# Patient Record
Sex: MALE | Race: WHITE | Employment: FULL TIME | URBAN - NONMETROPOLITAN AREA
[De-identification: names, ages, dates, MRNs, and addresses within clinical notes are randomized per-mention and may not be internally consistent; named-entity substitution may affect disease eponyms.]

---

## 2017-01-18 ENCOUNTER — HOSPITAL ENCOUNTER (EMERGENCY)
Facility: HOSPITAL | Age: 37
Discharge: HOME | End: 2017-01-18
Attending: PHYSICIAN ASSISTANT | Admitting: PHYSICIAN ASSISTANT
Payer: COMMERCIAL

## 2017-01-18 VITALS
DIASTOLIC BLOOD PRESSURE: 101 MMHG | HEART RATE: 99 BPM | TEMPERATURE: 96.9 F | SYSTOLIC BLOOD PRESSURE: 148 MMHG | OXYGEN SATURATION: 96 % | RESPIRATION RATE: 18 BRPM

## 2017-01-18 DIAGNOSIS — S93.401A SPRAIN OF RIGHT ANKLE, UNSPECIFIED LIGAMENT, INITIAL ENCOUNTER: ICD-10-CM

## 2017-01-18 PROCEDURE — 99213 OFFICE O/P EST LOW 20 MIN: CPT | Performed by: PHYSICIAN ASSISTANT

## 2017-01-18 PROCEDURE — 73610 X-RAY EXAM OF ANKLE: CPT | Mod: TC,RT

## 2017-01-18 PROCEDURE — 99213 OFFICE O/P EST LOW 20 MIN: CPT

## 2017-01-18 ASSESSMENT — ENCOUNTER SYMPTOMS
PSYCHIATRIC NEGATIVE: 1
MYALGIAS: 1
CARDIOVASCULAR NEGATIVE: 1
ARTHRALGIAS: 1
NEUROLOGICAL NEGATIVE: 1
CONSTITUTIONAL NEGATIVE: 1

## 2017-01-18 NOTE — ED AVS SNAPSHOT
[facility]    [address] [place] [address]    [place] [address]    Phone:  [phone]                                       Lionel Chavez   MRN: [id number]    Department:  [facility]   Date of Visit:  1/18/2017           After Visit Summary Signature Page     I have received my discharge instructions, and my questions have been answered. I have discussed any challenges I see with this plan with the nurse or doctor.    ..........................................................................................................................................  Patient/Patient Representative Signature      ..........................................................................................................................................  Patient Representative Print Name and Relationship to Patient    ..................................................               ................................................  Date                                            Time    ..........................................................................................................................................  Reviewed by Signature/Title    ...................................................              ..............................................  Date                                                            Time

## 2017-01-18 NOTE — PROGRESS NOTES
Quick Note:    Ankle XR. Impression: Tiny calcific densities involving the anterior aspect of the tibiotalar joint. Routed to PCP Dr. Oliva and pt instructed to follow up.  ______

## 2017-01-18 NOTE — ED NOTES
Patient came in with right ankle pain that he hurt on January 12,2017. 7/10 pain. Feels tingling up right leg. Twisted his leg while walking.  Has not taken any pain medication.

## 2017-01-18 NOTE — ED AVS SNAPSHOT
[facility]    [address] [address]    Phone:  [phone]                                       Lionel Chavez   MRN: [id number]    Department:  [facility]   Date of Visit:  1/18/2017           Patient Information     Date Of Birth          1980        Your diagnoses for this visit were:     Sprain of right ankle, unspecified ligament, initial encounter        You were seen by Gisele Pimentel PA.      Follow-up Information     Follow up with Jc Oliva MD.    Specialty:  Family Practice    Why:  If symptoms worsen or if not resolving in next 10 days and for reevaluation of your blood pressure!!    Contact information:    [facility]  [address] [address]  [phone]          Follow up with [facility].    Specialty:  EMERGENCY MEDICINE    Why:  If further concerns develop    Contact information:    [address] [address] [phone]    Additional information:    From [facility]: Take [address]. Turn left at [address]. Turn left at the first stoplight on [address]. At the first stop sign, take a right onto [address]. Take a left into the parking lot and continue through until you reach the North enterance of the building.       From [place]: Take [address]. Take the [address] ramp towards [place]. Turn left onto [address]. Take a slight right onto [address] [facility] [address] [facility]. Turn left at the first stoplight on [address]. At the first stop sign, take a right onto [address]. Take a left into the parking lot and continue through until you reach the North enterance of the building.       From [place]: Take [address]. Take a right at [address]. At the first stop sign, take a right onto [address]. Take a left into the parking lot and continue through until you reach the North enterance of the building.      "  Discharge References/Attachments     ACE WRAP (ENGLISH)    SPRAIN, ANKLE (ADULT) (ENGLISH)    HIGH BLOOD PRESSURE, YOUR RISK FACTORS (ENGLISH)    HYPERTENSION, TO BE CONFIRMED (ENGLISH)         Review of your medicines      Notice     You have not been prescribed any medications.            Procedures and tests performed during your visit     XR Ankle Right G/E 3 Views      Orders Needing Specimen Collection     None      Pending Results     Date and Time Order Name Status Description    2017 1210 XR Ankle Right G/E 3 Views In process             Pending Culture Results     No orders found from 2017 to 2017.            Thank you for choosing [facility]       Thank you for choosing [facility] for your care. Our goal is always to provide you with excellent care. Hearing back from our patients is one way we can continue to improve our services. Please take a few minutes to complete the written survey that you may receive in the mail after you visit with us. Thank you!        [facility] Information     hint lets you send messages to your doctor, view your test results, renew your prescriptions, schedule appointments and more. To sign up, go to [email or web address] . Click on \"Log in\" on the left side of the screen, which will take you to the Welcome page. Then click on \"Sign up Now\" on the right side of the page.     You will be asked to enter the access code listed below, as well as some personal information. Please follow the directions to create your username and password.     Your access code is: [email or web address]  Expires: 2017 12:36 PM     Your access code will  in 90 days. If you need help or a new code, please call your [facility] clinic or [phone].        Care EveryWhere ID     This is your Care EveryWhere ID. This could be used by other organizations to access your [facility] medical records  [id number]        After Visit Summary       This is your record. Keep this with you and show " to your community pharmacist(s) and doctor(s) at your next visit.
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
## 2017-04-03 ENCOUNTER — HOSPITAL ENCOUNTER (EMERGENCY)
Facility: HOSPITAL | Age: 37
Discharge: LEFT BEFORE DISCHARGE | End: 2017-04-03
Attending: PHYSICIAN ASSISTANT | Admitting: PHYSICIAN ASSISTANT
Payer: COMMERCIAL

## 2017-04-03 VITALS
DIASTOLIC BLOOD PRESSURE: 115 MMHG | RESPIRATION RATE: 13 BRPM | SYSTOLIC BLOOD PRESSURE: 156 MMHG | HEART RATE: 105 BPM | OXYGEN SATURATION: 96 % | TEMPERATURE: 97.5 F

## 2017-04-03 DIAGNOSIS — T69.9XXA EXPOSURE TO ENVIRONMENTAL COLD, INITIAL ENCOUNTER: ICD-10-CM

## 2017-04-03 DIAGNOSIS — T68.XXXA HYPOTHERMIA, INITIAL ENCOUNTER: ICD-10-CM

## 2017-04-03 PROCEDURE — 99282 EMERGENCY DEPT VISIT SF MDM: CPT

## 2017-04-03 PROCEDURE — 99283 EMERGENCY DEPT VISIT LOW MDM: CPT | Performed by: PHYSICIAN ASSISTANT

## 2017-04-03 ASSESSMENT — ENCOUNTER SYMPTOMS
SHORTNESS OF BREATH: 0
DECREASED CONCENTRATION: 0
AGITATION: 0
COLOR CHANGE: 1

## 2017-04-03 NOTE — ED NOTES
Attempted to call pt at his contact number he provided To return or go to his provider to have his tdap injection updated. No connection made. Attempted X2. Pt was gone from room when RN in to give ordered medication. Pt had not been discharged.
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
## 2017-05-19 ENCOUNTER — APPOINTMENT (OUTPATIENT)
Dept: OCCUPATIONAL MEDICINE | Facility: OTHER | Age: 37
End: 2017-05-19

## 2017-05-19 PROCEDURE — 92552 PURE TONE AUDIOMETRY AIR: CPT

## 2017-08-02 DIAGNOSIS — S99.919A ANKLE INJURY: ICD-10-CM

## 2017-08-02 DIAGNOSIS — M25.571 PAIN IN JOINT, ANKLE AND FOOT, RIGHT: Primary | ICD-10-CM

## 2017-08-04 ENCOUNTER — HOSPITAL ENCOUNTER (OUTPATIENT)
Dept: MRI IMAGING | Facility: HOSPITAL | Age: 37
Discharge: HOME | End: 2017-08-04
Attending: FAMILY MEDICINE | Admitting: FAMILY MEDICINE
Payer: COMMERCIAL

## 2017-08-04 PROCEDURE — 73721 MRI JNT OF LWR EXTRE W/O DYE: CPT | Mod: TC,RT

## 2020-04-11 ENCOUNTER — HOSPITAL ENCOUNTER (EMERGENCY)
Facility: HOSPITAL | Age: 40
Discharge: HOME | End: 2020-04-11
Attending: NURSE PRACTITIONER | Admitting: NURSE PRACTITIONER
Payer: COMMERCIAL

## 2020-04-11 VITALS
SYSTOLIC BLOOD PRESSURE: 145 MMHG | RESPIRATION RATE: 18 BRPM | OXYGEN SATURATION: 98 % | TEMPERATURE: 97.9 F | DIASTOLIC BLOOD PRESSURE: 73 MMHG

## 2020-04-11 DIAGNOSIS — H66.002 ACUTE SUPPURATIVE OTITIS MEDIA OF LEFT EAR WITHOUT SPONTANEOUS RUPTURE OF TYMPANIC MEMBRANE: Primary | ICD-10-CM

## 2020-04-11 DIAGNOSIS — H10.33 ACUTE CONJUNCTIVITIS OF BOTH EYES: ICD-10-CM

## 2020-04-11 PROCEDURE — G0463 HOSPITAL OUTPT CLINIC VISIT: HCPCS

## 2020-04-11 PROCEDURE — 25000132 ZZH RX MED GY IP 250 OP 250 PS 637: Performed by: NURSE PRACTITIONER

## 2020-04-11 PROCEDURE — 99213 OFFICE O/P EST LOW 20 MIN: CPT | Mod: Z6 | Performed by: NURSE PRACTITIONER

## 2020-04-11 RX ADMIN — AMOXICILLIN AND CLAVULANATE POTASSIUM 1 TABLET: 875; 125 TABLET, FILM COATED ORAL at 19:04

## 2020-04-11 ASSESSMENT — ENCOUNTER SYMPTOMS
NAUSEA: 0
EYE REDNESS: 1
EYE PAIN: 0
COUGH: 0
EYE DISCHARGE: 1
FEVER: 0
CHILLS: 0
PHOTOPHOBIA: 0
VOMITING: 0
SHORTNESS OF BREATH: 0

## 2020-04-11 NOTE — ED TRIAGE NOTES
Pt presents with left ear pain since today, right and left eye redness and mattering since yesterday, slight sore throat since Thursday.

## 2020-04-11 NOTE — ED AVS SNAPSHOT
[facility]  [address] [place] [address]  [place] [address]  Phone:  [phone]                                    Lionel Chavez   MRN: [id number]    Department:  [facility]   Date of Visit:  4/11/2020           After Visit Summary Signature Page    I have received my discharge instructions, and my questions have been answered. I have discussed any challenges I see with this plan with the nurse or doctor.    ..........................................................................................................................................  Patient/Patient Representative Signature      ..........................................................................................................................................  Patient Representative Print Name and Relationship to Patient    ..................................................               ................................................  Date                                   Time    ..........................................................................................................................................  Reviewed by Signature/Title    ...................................................              ..............................................  Date                                               Time          22EPIC Rev 08/18

## 2020-04-12 NOTE — DISCHARGE INSTRUCTIONS
Take antibiotic as prescribed for your left ear infection.  Place a warm compress to the affected ear.  You can take Tylenol or ibuprofen as needed for the pain.    Make sure you are drinking fluids to stay hydrated can try an OTC oral decongestant to help with the nasal congestion.  Make sure you are washing your hands and avoid touching your eyes.    Follow-up with your doctor in 2 weeks if no improvement in symptoms.    Return to emergency department for worsening or concerning symptoms.

## 2021-04-16 ENCOUNTER — HOSPITAL ENCOUNTER (EMERGENCY)
Facility: HOSPITAL | Age: 41
Discharge: HOME | End: 2021-04-16
Attending: EMERGENCY MEDICINE | Admitting: EMERGENCY MEDICINE
Payer: COMMERCIAL

## 2021-04-16 VITALS
DIASTOLIC BLOOD PRESSURE: 113 MMHG | TEMPERATURE: 97.3 F | SYSTOLIC BLOOD PRESSURE: 167 MMHG | WEIGHT: 315 LBS | RESPIRATION RATE: 13 BRPM | OXYGEN SATURATION: 97 % | HEART RATE: 103 BPM

## 2021-04-16 DIAGNOSIS — E11.9 TYPE 2 DIABETES MELLITUS WITHOUT COMPLICATION, WITHOUT LONG-TERM CURRENT USE OF INSULIN (H): ICD-10-CM

## 2021-04-16 LAB
ALBUMIN SERPL-MCNC: 3.8 G/DL (ref 3.4–5)
ALBUMIN UR-MCNC: NEGATIVE MG/DL
ALP SERPL-CCNC: 157 U/L (ref 40–150)
ALT SERPL W P-5'-P-CCNC: 115 U/L (ref 0–70)
ANION GAP SERPL CALCULATED.3IONS-SCNC: 6 MMOL/L (ref 3–14)
APPEARANCE UR: CLEAR
AST SERPL W P-5'-P-CCNC: 43 U/L (ref 0–45)
BACTERIA #/AREA URNS HPF: ABNORMAL /HPF
BASOPHILS # BLD AUTO: 0 10E9/L (ref 0–0.2)
BASOPHILS NFR BLD AUTO: 0.6 %
BILIRUB SERPL-MCNC: 0.6 MG/DL (ref 0.2–1.3)
BILIRUB UR QL STRIP: NEGATIVE
BUN SERPL-MCNC: 16 MG/DL (ref 7–30)
CALCIUM SERPL-MCNC: 8.7 MG/DL (ref 8.5–10.1)
CHLORIDE SERPL-SCNC: 101 MMOL/L (ref 94–109)
CO2 SERPL-SCNC: 24 MMOL/L (ref 20–32)
COLOR UR AUTO: ABNORMAL
CREAT SERPL-MCNC: 0.88 MG/DL (ref 0.66–1.25)
DIFFERENTIAL METHOD BLD: NORMAL
EOSINOPHIL # BLD AUTO: 0.3 10E9/L (ref 0–0.7)
EOSINOPHIL NFR BLD AUTO: 5.3 %
ERYTHROCYTE [DISTWIDTH] IN BLOOD BY AUTOMATED COUNT: 12.7 % (ref 10–15)
EST. AVERAGE GLUCOSE BLD GHB EST-MCNC: 229 MG/DL
GFR SERPL CREATININE-BSD FRML MDRD: >90 ML/MIN/{1.73_M2}
GLUCOSE BLDC GLUCOMTR-MCNC: 335 MG/DL (ref 70–99)
GLUCOSE BLDC GLUCOMTR-MCNC: 450 MG/DL (ref 70–99)
GLUCOSE SERPL-MCNC: 411 MG/DL (ref 70–99)
GLUCOSE UR STRIP-MCNC: >1000 MG/DL
HBA1C MFR BLD: 9.6 % (ref 0–5.6)
HCT VFR BLD AUTO: 49.2 % (ref 40–53)
HGB BLD-MCNC: 16.9 G/DL (ref 13.3–17.7)
HGB UR QL STRIP: NEGATIVE
IMM GRANULOCYTES # BLD: 0 10E9/L (ref 0–0.4)
IMM GRANULOCYTES NFR BLD: 0.3 %
KETONES UR STRIP-MCNC: 10 MG/DL
LEUKOCYTE ESTERASE UR QL STRIP: NEGATIVE
LYMPHOCYTES # BLD AUTO: 1.3 10E9/L (ref 0.8–5.3)
LYMPHOCYTES NFR BLD AUTO: 21.4 %
MCH RBC QN AUTO: 31.2 PG (ref 26.5–33)
MCHC RBC AUTO-ENTMCNC: 34.3 G/DL (ref 31.5–36.5)
MCV RBC AUTO: 91 FL (ref 78–100)
MONOCYTES # BLD AUTO: 0.4 10E9/L (ref 0–1.3)
MONOCYTES NFR BLD AUTO: 7.1 %
NEUTROPHILS # BLD AUTO: 4 10E9/L (ref 1.6–8.3)
NEUTROPHILS NFR BLD AUTO: 65.3 %
NITRATE UR QL: NEGATIVE
NRBC # BLD AUTO: 0 10*3/UL
NRBC BLD AUTO-RTO: 0 /100
PH UR STRIP: 5.5 PH (ref 4.7–8)
PLATELET # BLD AUTO: 181 10E9/L (ref 150–450)
POTASSIUM SERPL-SCNC: 4.5 MMOL/L (ref 3.4–5.3)
PROT SERPL-MCNC: 7.8 G/DL (ref 6.8–8.8)
RBC # BLD AUTO: 5.41 10E12/L (ref 4.4–5.9)
RBC #/AREA URNS AUTO: 1 /HPF (ref 0–2)
SODIUM SERPL-SCNC: 131 MMOL/L (ref 133–144)
SOURCE: ABNORMAL
SP GR UR STRIP: 1.01 (ref 1–1.03)
SQUAMOUS #/AREA URNS AUTO: 0 /HPF (ref 0–1)
UROBILINOGEN UR STRIP-MCNC: NORMAL MG/DL (ref 0–2)
WBC # BLD AUTO: 6.2 10E9/L (ref 4–11)
WBC #/AREA URNS AUTO: 1 /HPF (ref 0–5)

## 2021-04-16 PROCEDURE — 999N001182 HC STATISTIC ESTIMATED AVERAGE GLUCOSE: Performed by: EMERGENCY MEDICINE

## 2021-04-16 PROCEDURE — 81001 URINALYSIS AUTO W/SCOPE: CPT | Performed by: EMERGENCY MEDICINE

## 2021-04-16 PROCEDURE — 80053 COMPREHEN METABOLIC PANEL: CPT | Performed by: EMERGENCY MEDICINE

## 2021-04-16 PROCEDURE — 83036 HEMOGLOBIN GLYCOSYLATED A1C: CPT | Performed by: EMERGENCY MEDICINE

## 2021-04-16 PROCEDURE — 36415 COLL VENOUS BLD VENIPUNCTURE: CPT

## 2021-04-16 PROCEDURE — 99284 EMERGENCY DEPT VISIT MOD MDM: CPT | Performed by: EMERGENCY MEDICINE

## 2021-04-16 PROCEDURE — 258N000003 HC RX IP 258 OP 636: Performed by: EMERGENCY MEDICINE

## 2021-04-16 PROCEDURE — 96361 HYDRATE IV INFUSION ADD-ON: CPT

## 2021-04-16 PROCEDURE — 999N001017 HC STATISTIC GLUCOSE BY METER IP

## 2021-04-16 PROCEDURE — 96374 THER/PROPH/DIAG INJ IV PUSH: CPT

## 2021-04-16 PROCEDURE — 85025 COMPLETE CBC W/AUTO DIFF WBC: CPT | Performed by: EMERGENCY MEDICINE

## 2021-04-16 PROCEDURE — 250N000012 HC RX MED GY IP 250 OP 636 PS 637: Performed by: EMERGENCY MEDICINE

## 2021-04-16 PROCEDURE — 99284 EMERGENCY DEPT VISIT MOD MDM: CPT | Mod: 25

## 2021-04-16 RX ORDER — DEXTROSE MONOHYDRATE 25 G/50ML
25-50 INJECTION, SOLUTION INTRAVENOUS
Status: DISCONTINUED | OUTPATIENT
Start: 2021-04-16 | End: 2021-04-16 | Stop reason: HOSPADM

## 2021-04-16 RX ORDER — SODIUM CHLORIDE 9 MG/ML
INJECTION, SOLUTION INTRAVENOUS CONTINUOUS
Status: DISCONTINUED | OUTPATIENT
Start: 2021-04-16 | End: 2021-04-16 | Stop reason: HOSPADM

## 2021-04-16 RX ORDER — NICOTINE POLACRILEX 4 MG
15-30 LOZENGE BUCCAL
Status: DISCONTINUED | OUTPATIENT
Start: 2021-04-16 | End: 2021-04-16 | Stop reason: HOSPADM

## 2021-04-16 RX ADMIN — INSULIN HUMAN 8 UNITS: 100 INJECTION, SOLUTION PARENTERAL at 09:54

## 2021-04-16 RX ADMIN — SODIUM CHLORIDE 1000 ML: 9 INJECTION, SOLUTION INTRAVENOUS at 09:54

## 2021-04-16 ASSESSMENT — ENCOUNTER SYMPTOMS
DYSURIA: 0
SINUS PAIN: 0
ABDOMINAL PAIN: 0
BACK PAIN: 0
SHORTNESS OF BREATH: 0
POLYDIPSIA: 1
APPETITE CHANGE: 0
POLYPHAGIA: 1
ACTIVITY CHANGE: 0
CHEST TIGHTNESS: 0

## 2021-04-16 NOTE — ED NOTES
Pt educated on metformin BID until appointment with Dr. Oliva next week and get set up with diabetic ed after that. Pt educated on high and low blood sugar symptoms and will return here this weekend with any other concerns for worsening symptoms.

## 2021-04-16 NOTE — DISCHARGE INSTRUCTIONS
Lionel, you have a follow up appointment with Dr Jc Oliva at the [facility] on Wednesday April 21st at 3pm.  They will arrange Diabetic Education for you at that appointment.

## 2021-04-16 NOTE — ED TRIAGE NOTES
"Presents with reports of high blood sugar. Patient denies hx of DM - states he has been experiencing polydipsia and polyuria for \"a while now.\" Took his blood sugar on his Mom's glucometer and the reading was 506. BG in triage 450.  "

## 2021-04-16 NOTE — ED NOTES
Care Transitions focused note:      Follow up appointment made with Dr Jc Oliva at [facility] on Wednesday April 21st at 3pm.  They will set up with Diabetic education.    JOSELIN Ricardo

## 2021-04-16 NOTE — ED PROVIDER NOTES
"  History     Chief Complaint   Patient presents with     Hyperglycemia     HPI  Lionel Chavez is a 40 year old male who comes to the emergency department complaining of elevated blood sugar.  Mr. Campbell states he has had polyuria and polydipsia over the past few days.  His mother is diabetic.  He did an Accu-Chek at home and his blood sugar was elevated to \"500\".  He states he is otherwise been feeling well.  He denies feeling dizzy or lightheaded.  Is not having abdominal pain.  He has had no nausea or vomiting.  He said no change in his bowel or bladder habits.  He has no diagnosis of being diabetic.  He states he always has been a large person.  He states that he is actually gained a fair amount of weight recently as he had an ankle injury and has not been able to exercise.    Allergies:  No Known Allergies    Problem List:    Patient Active Problem List    Diagnosis Date Noted     Type 2 diabetes mellitus without complication, without long-term current use of insulin (H) 04/16/2021     Priority: Medium        Past Medical History:    No past medical history on file.    Past Surgical History:    No past surgical history on file.    Family History:    No family history on file.    Social History:  Marital Status:  Single [1]  Social History     Tobacco Use     Smoking status: Never Smoker   Substance Use Topics     Alcohol use: Yes     Comment: occasional     Drug use: No        Medications:    metFORMIN (GLUCOPHAGE) 500 MG tablet          Review of Systems   Constitutional: Negative for activity change and appetite change.   HENT: Negative for congestion and sinus pain.    Respiratory: Negative for chest tightness and shortness of breath.    Cardiovascular: Negative for chest pain.   Gastrointestinal: Negative for abdominal pain.   Endocrine: Positive for polydipsia and polyphagia.   Genitourinary: Negative for dysuria.   Musculoskeletal: Negative for back pain.   Other appropriate systems were reviewed and " found to be unremarkable    Physical Exam   BP: (!) 157/115  Pulse: 88  Temp: 97.3  F (36.3  C)  Resp: 16  Weight: (!) 164 kg (361 lb 8.9 oz)  SpO2: 95 %      Physical Exam this is a 40-year-old gentleman who is awake alert he is very pleasant and cooperative with my exam he does not appear in acute distress HEENT normocephalic atraumatic extraocular muscles intact pupils equally round and active to light oropharynx is clear oral mucosa is moist neck is supple his range of motion pain lungs are clear bilaterally heart is regular rate and rhythm S1-S2 sounds are appreciated the abdomen is obese its nontender no mass no organomegaly no rebound no involuntary guarding extremities has full range of motion 5/5 strength no edema is noted neurologic exam no focal neurologic deficits appreciated    ED Course                             Results for orders placed or performed during the hospital encounter of 04/16/21 (from the past 24 hour(s))   Glucose by meter   Result Value Ref Range    Glucose 450 (H) 70 - 99 mg/dL   Routine UA with microscopic   Result Value Ref Range    Color Urine Light Yellow     Appearance Urine Clear     Glucose Urine >1000 (A) NEG^Negative mg/dL    Bilirubin Urine Negative NEG^Negative    Ketones Urine 10 (A) NEG^Negative mg/dL    Specific Gravity Urine 1.015 1.003 - 1.035    Blood Urine Negative NEG^Negative    pH Urine 5.5 4.7 - 8.0 pH    Protein Albumin Urine Negative NEG^Negative mg/dL    Urobilinogen mg/dL Normal 0.0 - 2.0 mg/dL    Nitrite Urine Negative NEG^Negative    Leukocyte Esterase Urine Negative NEG^Negative    Source Midstream Urine     WBC Urine 1 0 - 5 /HPF    RBC Urine 1 0 - 2 /HPF    Bacteria Urine None (A) NEG^Negative /HPF    Squamous Epithelial /HPF Urine 0 0 - 1 /HPF   CBC with platelets differential   Result Value Ref Range    WBC 6.2 4.0 - 11.0 10e9/L    RBC Count 5.41 4.4 - 5.9 10e12/L    Hemoglobin 16.9 13.3 - 17.7 g/dL    Hematocrit 49.2 40.0 - 53.0 %    MCV 91 78 - 100  fl    MCH 31.2 26.5 - 33.0 pg    MCHC 34.3 31.5 - 36.5 g/dL    RDW 12.7 10.0 - 15.0 %    Platelet Count 181 150 - 450 10e9/L    Diff Method Automated Method     % Neutrophils 65.3 %    % Lymphocytes 21.4 %    % Monocytes 7.1 %    % Eosinophils 5.3 %    % Basophils 0.6 %    % Immature Granulocytes 0.3 %    Nucleated RBCs 0 0 /100    Absolute Neutrophil 4.0 1.6 - 8.3 10e9/L    Absolute Lymphocytes 1.3 0.8 - 5.3 10e9/L    Absolute Monocytes 0.4 0.0 - 1.3 10e9/L    Absolute Eosinophils 0.3 0.0 - 0.7 10e9/L    Absolute Basophils 0.0 0.0 - 0.2 10e9/L    Abs Immature Granulocytes 0.0 0 - 0.4 10e9/L    Absolute Nucleated RBC 0.0    Comprehensive metabolic panel   Result Value Ref Range    Sodium 131 (L) 133 - 144 mmol/L    Potassium 4.5 3.4 - 5.3 mmol/L    Chloride 101 94 - 109 mmol/L    Carbon Dioxide 24 20 - 32 mmol/L    Anion Gap 6 3 - 14 mmol/L    Glucose 411 (H) 70 - 99 mg/dL    Urea Nitrogen 16 7 - 30 mg/dL    Creatinine 0.88 0.66 - 1.25 mg/dL    GFR Estimate >90 >60 mL/min/[1.73_m2]    GFR Estimate If Black >90 >60 mL/min/[1.73_m2]    Calcium 8.7 8.5 - 10.1 mg/dL    Bilirubin Total 0.6 0.2 - 1.3 mg/dL    Albumin 3.8 3.4 - 5.0 g/dL    Protein Total 7.8 6.8 - 8.8 g/dL    Alkaline Phosphatase 157 (H) 40 - 150 U/L     (H) 0 - 70 U/L    AST 43 0 - 45 U/L   Glucose by meter   Result Value Ref Range    Glucose 335 (H) 70 - 99 mg/dL       Medications   0.9% sodium chloride BOLUS (0 mLs Intravenous Stopped 4/16/21 1101)     Followed by   sodium chloride 0.9% infusion (has no administration in time range)   glucose gel 15-30 g (has no administration in time range)     Or   dextrose 50 % injection 25-50 mL (has no administration in time range)     Or   glucagon injection 1 mg (has no administration in time range)   insulin (regular) (HumuLIN R/NovoLIN R) injection 8 Units (8 Units Intravenous Given 4/16/21 0954)       Assessments & Plan (with Medical Decision Making)     I have reviewed the nursing notes.    I have  reviewed the findings, diagnosis, plan and need for follow up with the patient.  I will have Mr. Chavez follow-up with his primary care provider this coming week.  I will order a hemoglobin A1c.  Will start on Metformin.    New Prescriptions    METFORMIN (GLUCOPHAGE) 500 MG TABLET    Take 1 tablet (500 mg) by mouth 2 times daily (with meals)       Final diagnoses:   Type 2 diabetes mellitus without complication, without long-term current use of insulin (H)       4/16/2021   [facility]

## 2021-07-11 ENCOUNTER — HOSPITAL ENCOUNTER (EMERGENCY)
Facility: HOSPITAL | Age: 41
Discharge: HOME | End: 2021-07-11
Attending: NURSE PRACTITIONER | Admitting: NURSE PRACTITIONER
Payer: COMMERCIAL

## 2021-07-11 VITALS
HEART RATE: 100 BPM | TEMPERATURE: 97.6 F | DIASTOLIC BLOOD PRESSURE: 97 MMHG | RESPIRATION RATE: 20 BRPM | SYSTOLIC BLOOD PRESSURE: 146 MMHG | OXYGEN SATURATION: 98 %

## 2021-07-11 DIAGNOSIS — J32.9 SINUSITIS, UNSPECIFIED CHRONICITY, UNSPECIFIED LOCATION: ICD-10-CM

## 2021-07-11 DIAGNOSIS — J32.9 SINUSITIS: ICD-10-CM

## 2021-07-11 PROCEDURE — 99213 OFFICE O/P EST LOW 20 MIN: CPT | Performed by: NURSE PRACTITIONER

## 2021-07-11 PROCEDURE — G0463 HOSPITAL OUTPT CLINIC VISIT: HCPCS

## 2021-07-11 RX ORDER — FLUTICASONE PROPIONATE 50 MCG
1 SPRAY, SUSPENSION (ML) NASAL DAILY
Qty: 15.8 ML | Refills: 0 | Status: SHIPPED | OUTPATIENT
Start: 2021-07-11

## 2021-07-11 ASSESSMENT — ENCOUNTER SYMPTOMS
DIZZINESS: 0
NAUSEA: 0
SINUS PRESSURE: 1
DIARRHEA: 0
HEADACHES: 1
VOMITING: 0
SINUS PAIN: 1
TROUBLE SWALLOWING: 0
FEVER: 0
SORE THROAT: 1
SHORTNESS OF BREATH: 0
APPETITE CHANGE: 0
COUGH: 1
LIGHT-HEADEDNESS: 0
RHINORRHEA: 1
ACTIVITY CHANGE: 1
CHILLS: 0
FATIGUE: 1

## 2021-07-11 NOTE — DISCHARGE INSTRUCTIONS
Increase oral intake, cool mist vaporizer as needed, rest, avoid sharing utensils, practice good hand washing techniques, cover mouth when you cough and sneeze. Throw toothbursh away 24 hours after starting antibiotics.  Over the counter medications such as ibuprofen and/or acetaminophen for fever and generalized aches and pains. Ibuprofen 400 to 800 mg (2 - 4 tabs of over the counter med) every six to eight hours as needed;not to exceed maximum amount of 3200 mg in 24 hours.Tylenol 650 to 1000 mg every four to six hours as needed (not to exceed more than 4000 mg in a 24 hour period). May use interchangeably. Robitussin (guaifenesin) for cough. Chest rubs such as Keo's or Mentholatum may help reduce sore throat symptoms.  Chloraseptic spray for sore throat or menthol lozenges may be helpful for sore throat. Be reevaluated if symptoms persist longer than 10 - 14 days or worsen and if there is no improvement in 72 hours or worsening of symptoms.  Increase fluids.     OTC decongestants (oral or topical).  Decongestants (oral or topical) cause vasoconstriction of the nasal mucosa.  We prefer oral pseudoephedrine to phenylephrine and other oral OTC nasal decongestants. Side effects of oral decongestants may include tachycardia, elevated diastolic blood pressure, and palpitations. Pseudoephedrine 30 to 60 mg every four to six hours as needed for congestion. (Maximum dose is 240 mg in 24 hours). Do not use longer than 72 hours.    Commonly used topical decongestants include oxymetazoline, xylometazoline, and phenylephrine. Side effects of topical decongestants include nosebleeds and drying of the nasal membranes. Topical decongestants should only be used for two to three days; longer use may result in rebound nasal congestion after discontinuation.     Loratadine (Claritin) or cetirizine (Zyrtec) 20 mg  daily for ten to fourteen days to see if symptoms lessen or resolve. If the medication seems to help you may take 10  mg daily on an ongoing basis.  May buy over the counter.  .    Fluids, herbs, and foods for sore throat relief -- Adjusting the temperature and texture of foods and beverages may provide local relief of sore throat pain. While data showing benefit are quite limited, these approaches are intuitive. We typically advise these measures since they are likely to be safe with minimal adverse effect, and patients often describe relief of symptoms.  We suggest hydration with frozen (eg, ice or popsicles) or heated liquids (eg, teas, soups), rather than room temperature or refrigerated fluids in patient with significant sore throat pain. Very cold foods can have a numbing-like effect that temporarily reduces or alleviates the pain of swallowing. Ice cubes or frozen popsicles facilitate hydration; ice cream and frozen yogurt provide caloric intake.  Warm fluids and foods, including teas, soups, and soft non-irritating foods, may be better tolerated by patients with throat pain than irritating foods (eg, rough-textured or spicy foods) or fluids at room temperatures. Foods that coat the throat, including honey and hard candies, can facilitate intake of calories while temporarily relieving throat pain.

## 2021-07-11 NOTE — ED PROVIDER NOTES
History     Chief Complaint   Patient presents with     Sinusitis     HPI  Lionel Chavez is a 41 year old male who presents with a 4-day history of fatigue, ear pain, runny nose, sinus pain and pressure, cough, headaches, and a little bit of a sore throat.  No over-the-counter medications have been taken.  No known sick contacts.  Has not received the Covid vaccination.  Non-smoker.  States has had sinus injury and his symptoms are always exacerbated when he gets sick. Denies fevers, chills, nausea, vomiting, diarrhea, and shortness of breath.    Allergies:  No Known Allergies    Problem List:    Patient Active Problem List    Diagnosis Date Noted     Type 2 diabetes mellitus without complication, without long-term current use of insulin (H) 04/16/2021     Priority: Medium        Past Medical History:    No past medical history on file.    Past Surgical History:    No past surgical history on file.    Family History:    No family history on file.    Social History:  Marital Status:  Single [1]  Social History     Tobacco Use     Smoking status: Never Smoker   Substance Use Topics     Alcohol use: Yes     Comment: occasional     Drug use: No        Medications:    fluticasone (FLONASE) 50 MCG/ACT nasal spray          Review of Systems   Constitutional: Positive for activity change and fatigue. Negative for appetite change, chills and fever.   HENT: Positive for ear pain, rhinorrhea, sinus pressure, sinus pain and sore throat (little). Negative for trouble swallowing.    Eyes:        Hurt   Respiratory: Positive for cough. Negative for shortness of breath.    Gastrointestinal: Negative for diarrhea, nausea and vomiting.   Genitourinary: Negative.    Skin: Negative.    Neurological: Positive for headaches. Negative for dizziness and light-headedness.       Physical Exam   BP: (!) 158/102  Pulse: 100  Temp: 97.6  F (36.4  C)  Resp: 20  SpO2: 98 %      Physical Exam  Vitals and nursing note reviewed.   Constitutional:        General: He is in acute distress.      Appearance: He is overweight.   HENT:      Head: Normocephalic.      Right Ear: Tympanic membrane and ear canal normal.      Left Ear: Tympanic membrane and ear canal normal.      Nose: Mucosal edema and rhinorrhea (Thick white) present.      Right Sinus: No maxillary sinus tenderness or frontal sinus tenderness.      Left Sinus: No maxillary sinus tenderness or frontal sinus tenderness.      Mouth/Throat:      Lips: Pink.      Mouth: Mucous membranes are moist.      Pharynx: Uvula midline. No posterior oropharyngeal erythema.   Eyes:      Conjunctiva/sclera: Conjunctivae normal.   Cardiovascular:      Rate and Rhythm: Normal rate and regular rhythm.      Heart sounds: Normal heart sounds. No murmur heard.     Pulmonary:      Effort: Pulmonary effort is normal. No respiratory distress.      Breath sounds: Normal breath sounds. No wheezing.   Lymphadenopathy:      Cervical: Cervical adenopathy (Small to moderate) present.      Right cervical: Superficial cervical adenopathy present.      Left cervical: Superficial cervical adenopathy present.   Skin:     General: Skin is warm and dry.   Neurological:      Mental Status: He is alert and oriented to person, place, and time.   Psychiatric:         Behavior: Behavior normal.         ED Course        Procedures             No results found for this or any previous visit (from the past 24 hour(s)).    Medications - No data to display    Assessments & Plan (with Medical Decision Making)     I have reviewed the nursing notes.    I have reviewed the findings, diagnosis, plan and need for follow up with the patient.  (J32.9) Sinusitis  Comment: 41 year old male who presents with a 4-day history of fatigue, ear pain, runny nose, sinus pain and pressure, cough, headaches, and a little bit of a sore throat.  No over-the-counter medications have been taken.  No known sick contacts.  Has not received the Covid vaccination.  Non-smoker.   States has had sinus injury and his symptoms are always exacerbated when he gets sick. Denies fevers, chills, nausea, vomiting, diarrhea, and shortness of breath.    MDM:GARY. Lungs CTA    Explained to him that sinus infections are viral.  States I should just drink a 12 pack of beer then.   requesting antibiotic.    Plan: Flonase daily. Education provided and/or discussed for this/these medication and for viral URI. Treat symptoms conservatively with acetaminophen and  ibuprofen (if applicable) for fevers, body aches, and headaches, guaifenesin and/or honey for cough. May use chest rubs for sore throat and congestion, hot and cold liquids may help decrease sore throat and help you feel better. Increase fluids. You may utilize pseudoephedrine for congestion. Return to be reevaluated by ER/UC or your primary care provider if symptoms worsen, you develop breathing difficulties, or you do not improve in a reasonable time frame. It can take several days for a cough to resolve. It can take ten to fourteen days for upper respiratory symptoms to resolve.    Loratadine (Claritin) or cetirizine (Zyrtec) 20 mg  daily for ten to fourteen days to see if symptoms lessen or resolve. If the medication seems to help you may take 10 mg daily on an ongoing basis.  May buy over the counter.  These discharge instructions and medications were reviewed with him and understanding verbalized.    This document was prepared using a combination of typing and voice generated software.  While every attempt was made for accuracy, spelling and grammatical errors may exist.    Discharge Medication List as of 7/11/2021  3:26 PM      START taking these medications    Details   fluticasone (FLONASE) 50 MCG/ACT nasal spray Spray 1 spray into both nostrils daily, Disp-15.8 mL, R-0, E-Prescribe             Final diagnoses:   Sinusitis       7/11/2021   [facility]       Sangeetha Fabian, CNP  07/14/21 2958

## 2021-11-08 ENCOUNTER — HOSPITAL ENCOUNTER (EMERGENCY)
Facility: HOSPITAL | Age: 41
Discharge: HOME | End: 2021-11-08
Attending: NURSE PRACTITIONER | Admitting: NURSE PRACTITIONER
Payer: COMMERCIAL

## 2021-11-08 ENCOUNTER — APPOINTMENT (OUTPATIENT)
Dept: GENERAL RADIOLOGY | Facility: HOSPITAL | Age: 41
End: 2021-11-08
Attending: NURSE PRACTITIONER
Payer: COMMERCIAL

## 2021-11-08 VITALS
SYSTOLIC BLOOD PRESSURE: 169 MMHG | DIASTOLIC BLOOD PRESSURE: 96 MMHG | TEMPERATURE: 97.8 F | HEART RATE: 80 BPM | OXYGEN SATURATION: 97 % | RESPIRATION RATE: 18 BRPM

## 2021-11-08 DIAGNOSIS — S86.112A GASTROCNEMIUS STRAIN, LEFT, INITIAL ENCOUNTER: Primary | ICD-10-CM

## 2021-11-08 PROCEDURE — 99213 OFFICE O/P EST LOW 20 MIN: CPT | Performed by: NURSE PRACTITIONER

## 2021-11-08 PROCEDURE — G0463 HOSPITAL OUTPT CLINIC VISIT: HCPCS

## 2021-11-08 PROCEDURE — 73590 X-RAY EXAM OF LOWER LEG: CPT | Mod: LT

## 2021-11-08 ASSESSMENT — ENCOUNTER SYMPTOMS
SHORTNESS OF BREATH: 0
VOMITING: 0
FEVER: 0
NAUSEA: 0
PSYCHIATRIC NEGATIVE: 1
ARTHRALGIAS: 0
DIARRHEA: 0
CHILLS: 0
MYALGIAS: 1
WOUND: 0

## 2021-11-08 NOTE — ED TRIAGE NOTES
"C/o left lower leg pain. Pt reports doing some elyse last night, stepped wrong while carrying something heavy and instantly felt \"like I was hit in the left calf.\" Increased pain with ambulating.   "

## 2021-11-08 NOTE — ED PROVIDER NOTES
History     Chief Complaint   Patient presents with     Leg Pain     left calf pain. pt reports doing some elyse last night, carrying approximately 50lbs on his back when he stepped wrong and instantly felt like he was hit in the left calf.      HPI  Lionel Chavez is a 41 year old male (ambulatory) who presents to urgent care today with complaints of left lower leg pain which started while patient was elyse yesterday and stepped wrong and felt a pop.  Denies pain at rest and pain can reach 10/10 with ambulation.  No previous fractures, dislocations or surgeries to left leg.  No skin abnormalities.  No wounds.  No other concerns.     Allergies:  No Known Allergies    Problem List:    Patient Active Problem List    Diagnosis Date Noted     Type 2 diabetes mellitus without complication, without long-term current use of insulin (H) 04/16/2021     Priority: Medium        Past Medical History:    No past medical history on file.    Past Surgical History:    No past surgical history on file.    Family History:    No family history on file.    Social History:  Marital Status:  Single [1]  Social History     Tobacco Use     Smoking status: Never Smoker     Smokeless tobacco: Not on file   Substance Use Topics     Alcohol use: Yes     Comment: occasional     Drug use: No        Medications:    fluticasone (FLONASE) 50 MCG/ACT nasal spray      Review of Systems   Constitutional: Negative for chills and fever.   Respiratory: Negative for shortness of breath.    Cardiovascular: Negative for chest pain.   Gastrointestinal: Negative for diarrhea, nausea and vomiting.   Musculoskeletal: Positive for myalgias. Negative for arthralgias and gait problem.   Skin: Negative for wound.   Psychiatric/Behavioral: Negative.      Physical Exam   BP: 169/96  Pulse: 85  Temp: 97.8  F (36.6  C)  Resp: 18  SpO2: 97 %    Physical Exam  Vitals and nursing note reviewed.   Constitutional:       General: He is not in acute distress.      Appearance: He is not ill-appearing or toxic-appearing.   Cardiovascular:      Rate and Rhythm: Normal rate and regular rhythm.      Pulses: Normal pulses.      Heart sounds: Normal heart sounds.   Pulmonary:      Effort: Pulmonary effort is normal.      Breath sounds: Normal breath sounds.   Musculoskeletal:      Left hip: Normal.      Left knee: Normal.      Left lower leg: Tenderness (calf) present.      Left ankle: Normal.      Left Achilles Tendon: Normal. No tenderness or defects. Jean's test negative.      Left foot: Normal.   Skin:     General: Skin is warm and dry.      Capillary Refill: Capillary refill takes less than 2 seconds.   Neurological:      Mental Status: He is alert.   Psychiatric:         Mood and Affect: Mood normal.       ED Course     Results for orders placed or performed during the hospital encounter of 11/08/21 (from the past 24 hour(s))   XR Tibia & Fibula Left 2 Views    Narrative    XR TIBIA & FIBULA LT 2 VW    HISTORY: 41 years Male left leg pain    COMPARISON: None    TECHNIQUE: Left tibia and fibula 4 views    FINDINGS: The tibia and fibula are intact. There is no evidence of  fracture or dislocation.      Impression    IMPRESSION: No evidence of fracture or dislocation. No concerning  osseous changes are present.    NATANAEL FELIPE MD         SYSTEM ID:  [id number]       Medications - No data to display    Assessments & Plan (with Medical Decision Making)     I have reviewed the nursing notes.    I have reviewed the findings, diagnosis, plan and need for follow up with the patient.  (S8.112A) Gastrocnemius strain, left, initial encounter  (primary encounter diagnosis)  Plan: Orthopedic  Referral  Patient ambulatory.  Patient was elyse yesterday and stepped down at a decline yesterday while carrying a package shingles and felt a pop.  Denies pain at rest and states pain can increase to 10/10 with ambulation at times.  X-ray of left tibia and fibula completed and  impression shows no evidence of fracture or dislocation.  Left Achilles tendon appears normal as there is no tenderness upon exam and Jean test is negative.  CMS intact.  Pedal pulses 2+/equal.  No swelling, bruising.  Denies any numbness or tingling.  Pain localized to gastrocnemius muscle consistent with muscle strain.  Patient to rest, ice, compression (Ace wrap applied in urgent care), and elevation.  Crutches given in urgent care to assist with ambulation.  Patient to alternate tylenol and ibuprofen.  [facility] if no improvement or worsening in condition.  Patient to follow-up with primary care provider or return to urgent care-ED as needed.  Patient in agreement with treatment plan.    Discharge Medication List as of 11/8/2021 10:20 AM        Final diagnoses:   Gastrocnemius strain, left, initial encounter     11/8/2021   [facility]     Tatiana Paris, ALEXANDRA  11/08/21 1222

## 2021-11-08 NOTE — ED TRIAGE NOTES
Blood pressure rechecked. Pt stated he usually runs around 160's over 90's. He reports that Dr. Oliva advised him they would just watch it..

## 2021-11-08 NOTE — ED TRIAGE NOTES
Patient presents to urgent care for left leg pain. Pt reports that he was elyse yesterday, he stepped wrong and felt something hit him in the lower leg, on the inner part of the lower leg between shin and calf. Pain is 9/10 when ambulating. Patient reports he has not taken any tylenol or anything else for pain.

## 2021-11-08 NOTE — DISCHARGE INSTRUCTIONS
Rest  Ice  Compression with Ace wrap  Elevate  Crutches for additional support as needed.  [facility] if no improvement or worsening in condition.  Number is [phone]  Return to urgent care-ED as needed.